# Patient Record
Sex: MALE | Race: WHITE | NOT HISPANIC OR LATINO | Employment: FULL TIME | URBAN - METROPOLITAN AREA
[De-identification: names, ages, dates, MRNs, and addresses within clinical notes are randomized per-mention and may not be internally consistent; named-entity substitution may affect disease eponyms.]

---

## 2024-01-31 ENCOUNTER — OFFICE VISIT (OUTPATIENT)
Dept: PRIMARY CARE | Facility: CLINIC | Age: 34
End: 2024-01-31
Payer: COMMERCIAL

## 2024-01-31 VITALS
SYSTOLIC BLOOD PRESSURE: 124 MMHG | DIASTOLIC BLOOD PRESSURE: 80 MMHG | HEART RATE: 90 BPM | TEMPERATURE: 97.4 F | OXYGEN SATURATION: 99 %

## 2024-01-31 DIAGNOSIS — J01.00 ACUTE NON-RECURRENT MAXILLARY SINUSITIS: Primary | ICD-10-CM

## 2024-01-31 PROCEDURE — 99213 OFFICE O/P EST LOW 20 MIN: CPT | Performed by: FAMILY MEDICINE

## 2024-01-31 RX ORDER — AMOXICILLIN AND CLAVULANATE POTASSIUM 875; 125 MG/1; MG/1
875 TABLET, FILM COATED ORAL 2 TIMES DAILY
Qty: 20 TABLET | Refills: 0 | Status: SHIPPED | OUTPATIENT
Start: 2024-01-31 | End: 2024-02-10

## 2024-01-31 ASSESSMENT — ENCOUNTER SYMPTOMS
SINUS PRESSURE: 1
SINUS PAIN: 1

## 2024-01-31 NOTE — PROGRESS NOTES
Subjective   Patient ID: Pepito Purcell is a 33 y.o. male who presents for Nasal Congestion (And pressure x 2 weeks).  HPI patient presents for sinusitis.  He has been having pain in his left maxillary sinus x 2 weeks.  Started about 2 weeks prior with abnormal cold and then has rapidly progressed.  Pain is significant.  Lost taste and smell a week ago.  Did not test for COVID at the time.    There is no problem list on file for this patient.      Social Connections: Not on file       No current outpatient medications on file prior to visit.     No current facility-administered medications on file prior to visit.        Vitals:    01/31/24 1351   BP: 124/80   Pulse: 90   Temp: 36.3 °C (97.4 °F)   SpO2: 99%     There were no vitals filed for this visit.    Review of Systems   HENT:  Positive for congestion, sinus pressure and sinus pain.    All other systems reviewed and are negative.      Objective     Physical Exam  Vitals reviewed.   Constitutional:       General: He is not in acute distress.     Appearance: Normal appearance. He is well-developed. He is not diaphoretic.   HENT:      Head: Normocephalic and atraumatic.      Right Ear: Tympanic membrane normal.      Left Ear: Tympanic membrane normal.      Nose: Nose normal.      Comments: Purulent drainage in L nares     Mouth/Throat:      Mouth: Mucous membranes are moist.   Eyes:      Pupils: Pupils are equal, round, and reactive to light.   Cardiovascular:      Rate and Rhythm: Normal rate and regular rhythm.      Heart sounds: Normal heart sounds. No murmur heard.     No friction rub. No gallop.   Pulmonary:      Effort: Pulmonary effort is normal.      Breath sounds: Normal breath sounds. No rales.   Abdominal:      General: Bowel sounds are normal.      Palpations: Abdomen is soft.      Tenderness: There is no abdominal tenderness.   Musculoskeletal:      Cervical back: Normal range of motion and neck supple.   Skin:     General: Skin is warm and dry.    Neurological:      Mental Status: He is alert.   Psychiatric:         Mood and Affect: Mood normal.         No visits with results within 2 Month(s) from this visit.   Latest known visit with results is:   Legacy Encounter on 11/02/2021   Component Date Value Ref Range Status    SARS-CoV-2 Result 11/02/2021 NOT DETECTED  Not Detected Final    Comment: .  This assay is designed to detect the N, ORF1ab and/or S genes of SARS-CoV-2   via nucleic acid amplification.  A Negative (NOT DETECTED) result does not   preclude 2019-nCoV infection since the adequacy of sample collection and/or   low viral burden may result in presence of viral nucleic acids below the   clinical sensitivity of this test method.  Negative (NOT DETECTED) result   should not be used as the sole basis for treatment or other patient   management decisions. Rather negative results should be combined with   clinical observations, patient history, and epidemiological information to   make patient management decisions.    Fact sheet for providers: https://www.fda.gov/media/806092/download  Fact sheet for patients: https://www.fda.gov/media/416959/download    This test has received FDA Emergency Use Authorization (EUA) and has been   verified by [facility] ([facility]). This test   is only authorized for the duration of time that circumstances                            exist to   justify the authorization of the emergency use of in vitro diagnostic tests   for the detection of SARS-CoV-2 virus and/or diagnosis of COVID-19 infection   under section 564(b)(1) of the Act, 21 U.S.C. 360bbb-3(b)(1), unless the   authorization is terminated or revoked sooner.      [facility] is certified under CLIA-88 as   qualified to perform high complexity testing. Testing is performed in the   [facility] laboratories located at [address] [place] [address].         Assessment/Plan   Problem List Items  Addressed This Visit    None  Visit Diagnoses         Codes    Acute non-recurrent maxillary sinusitis    -  Primary J01.00    Relevant Medications    amoxicillin-pot clavulanate (Augmentin) 875-125 mg tablet          Augmentin x 10 days

## 2024-05-09 ENCOUNTER — OFFICE VISIT (OUTPATIENT)
Dept: PRIMARY CARE | Facility: CLINIC | Age: 34
End: 2024-05-09
Payer: COMMERCIAL

## 2024-05-09 VITALS
HEART RATE: 82 BPM | DIASTOLIC BLOOD PRESSURE: 76 MMHG | WEIGHT: 218 LBS | OXYGEN SATURATION: 98 % | TEMPERATURE: 98.2 F | SYSTOLIC BLOOD PRESSURE: 116 MMHG

## 2024-05-09 DIAGNOSIS — N49.2 CELLULITIS, SCROTUM: Primary | ICD-10-CM

## 2024-05-09 DIAGNOSIS — N50.89 SCROTAL SWELLING: ICD-10-CM

## 2024-05-09 DIAGNOSIS — Z85.47 HISTORY OF TESTICULAR CANCER: ICD-10-CM

## 2024-05-09 PROCEDURE — 99214 OFFICE O/P EST MOD 30 MIN: CPT | Performed by: PHYSICIAN ASSISTANT

## 2024-05-09 RX ORDER — AMOXICILLIN AND CLAVULANATE POTASSIUM 875; 125 MG/1; MG/1
875 TABLET, FILM COATED ORAL 2 TIMES DAILY
Qty: 14 TABLET | Refills: 0 | Status: SHIPPED | OUTPATIENT
Start: 2024-05-09 | End: 2024-05-16

## 2024-05-09 ASSESSMENT — ENCOUNTER SYMPTOMS
FREQUENCY: 0
DYSURIA: 0
ABDOMINAL PAIN: 0
DIARRHEA: 0

## 2024-05-09 NOTE — PROGRESS NOTES
"Subjective   Patient ID: Pepito Purcell is a 34 y.o. male who presents for Groin Swelling (L sided testicle swelling x\"weeks\" since popping a pimple on scrotom).    HPI   Patient complains of scrotal swelling and redness. About 2 weeks popped a small pimple on left side of his scrotum (states he periodically gets these on his skin various places, including scrotum, so didn't seem like anything unusual). Felt like it drained internally rather than outward. It started swelling and getting more red around the area later that day. Was more sore and felt like his left testicle was even a little swollen. States it was Sore up into left groin area.  Felt a little feverish at times last week. Degree of redness and swelling is improving the past few days. No fevers now. No longer sore up into groin area.     Hx of right testicular cancer in 11/2013  Last saw oncologist Dr Marshal Plummer in 2019 and had negative surveillance testing and felt no further follow up needed.     No high risk sexual activity.       Review of Systems   Gastrointestinal:  Negative for abdominal pain and diarrhea.   Genitourinary:  Negative for dysuria, frequency and penile discharge.       Past Medical History:   Diagnosis Date    Asthma ([facility])     Testicular cancer (Multi) 11/2013    right, with inguinal lymph node resection.      Family History   Problem Relation Name Age of Onset    Parkinsonism Mother      Valvular heart disease Mother      Hyperlipidemia Father      Diabetes Maternal Grandmother      Diabetes Maternal Grandfather      Hypertension Maternal Grandfather      Other (metastatic cancer) Maternal Grandfather      Lung cancer Paternal Grandmother        Social History     Tobacco Use    Smoking status: Some Days     Types: Cigarettes    Smokeless tobacco: Never            Objective   /76   Pulse 82   Temp 36.8 °C (98.2 °F)   Wt 98.9 kg (218 lb)   SpO2 98%     Physical Exam  Vitals and nursing note reviewed.   Constitutional:  "      Appearance: He is not ill-appearing.   HENT:      Head: Normocephalic.   Eyes:      General: No scleral icterus.  Cardiovascular:      Rate and Rhythm: Normal rate and regular rhythm.   Pulmonary:      Effort: Pulmonary effort is normal.      Breath sounds: Normal breath sounds.   Abdominal:      Palpations: Abdomen is soft. There is no mass.      Tenderness: There is no abdominal tenderness.   Genitourinary:     Comments: There is a small area of redness on skin of left side of scrotum, with a small area of density on skin centrally where appears previous pustule was located. It is minimally tender (much improved per patient). No testicular masses or swelling noted. No tenderness or swelling in left groin/inguinal area.   Skin:     General: Skin is warm and dry.   Neurological:      Mental Status: He is alert.   Psychiatric:         Mood and Affect: Affect normal.         Assessment/Plan   Diagnoses and all orders for this visit:  Cellulitis, scrotum  -     US scrotum; Future  -     amoxicillin-pot clavulanate (Augmentin) 875-125 mg tablet; Take 1 tablet (875 mg) by mouth 2 times a day for 7 days.  Scrotal swelling  -     US scrotum; Future  History of testicular cancer  -     US scrotum; Future       Suspect mild localized skin infection that is already improving, but due to patient's hx of testicular cancer will get scrotal ultrasound.   Will treat remaining mild cellulitis with Rx Augmentin bid x 7 days.   Monitor for any worsening.   If any symptoms persist in 3 weeks, he should follow up in the office for recheck.

## 2024-05-10 ENCOUNTER — HOSPITAL ENCOUNTER (OUTPATIENT)
Dept: RADIOLOGY | Facility: HOSPITAL | Age: 34
Discharge: HOME | End: 2024-05-10
Payer: COMMERCIAL

## 2024-05-10 DIAGNOSIS — N49.2 CELLULITIS, SCROTUM: ICD-10-CM

## 2024-05-10 DIAGNOSIS — Z85.47 HISTORY OF TESTICULAR CANCER: ICD-10-CM

## 2024-05-10 DIAGNOSIS — N50.89 SCROTAL SWELLING: ICD-10-CM

## 2024-05-10 PROCEDURE — 76870 US EXAM SCROTUM: CPT
